# Patient Record
Sex: FEMALE | Race: WHITE | NOT HISPANIC OR LATINO | Employment: STUDENT | RURAL
[De-identification: names, ages, dates, MRNs, and addresses within clinical notes are randomized per-mention and may not be internally consistent; named-entity substitution may affect disease eponyms.]

---
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
## 2022-09-02 ENCOUNTER — PATIENT ROUNDING (BHMG ONLY) (OUTPATIENT)
Dept: FAMILY MEDICINE CLINIC | Facility: CLINIC | Age: 22
End: 2022-09-02

## 2022-09-02 ENCOUNTER — OFFICE VISIT (OUTPATIENT)
Dept: FAMILY MEDICINE CLINIC | Facility: CLINIC | Age: 22
End: 2022-09-02

## 2022-09-02 VITALS
DIASTOLIC BLOOD PRESSURE: 78 MMHG | OXYGEN SATURATION: 99 % | BODY MASS INDEX: 19.4 KG/M2 | WEIGHT: 128 LBS | TEMPERATURE: 98 F | HEART RATE: 82 BPM | HEIGHT: 68 IN | SYSTOLIC BLOOD PRESSURE: 115 MMHG

## 2022-09-02 DIAGNOSIS — J02.9 SORE THROAT: Primary | ICD-10-CM

## 2022-09-02 DIAGNOSIS — R06.7 SNEEZING: ICD-10-CM

## 2022-09-02 DIAGNOSIS — J06.9 ACUTE URI: ICD-10-CM

## 2022-09-02 DIAGNOSIS — R05.9 COUGH: ICD-10-CM

## 2022-09-02 LAB
EXPIRATION DATE: NORMAL
EXPIRATION DATE: NORMAL
FLUAV AG UPPER RESP QL IA.RAPID: NOT DETECTED
FLUBV AG UPPER RESP QL IA.RAPID: NOT DETECTED
INTERNAL CONTROL: NORMAL
INTERNAL CONTROL: NORMAL
Lab: NORMAL
Lab: NORMAL
S PYO AG THROAT QL: NEGATIVE
SARS-COV-2 AG UPPER RESP QL IA.RAPID: NOT DETECTED

## 2022-09-02 PROCEDURE — 87880 STREP A ASSAY W/OPTIC: CPT | Performed by: NURSE PRACTITIONER

## 2022-09-02 PROCEDURE — 87428 SARSCOV & INF VIR A&B AG IA: CPT | Performed by: NURSE PRACTITIONER

## 2022-09-02 PROCEDURE — 99203 OFFICE O/P NEW LOW 30 MIN: CPT | Performed by: NURSE PRACTITIONER

## 2022-09-02 RX ORDER — LEVOCETIRIZINE DIHYDROCHLORIDE 5 MG/1
5 TABLET, FILM COATED ORAL EVERY EVENING
Qty: 30 TABLET | Refills: 3 | Status: SHIPPED | OUTPATIENT
Start: 2022-09-02 | End: 2022-10-02

## 2022-09-02 RX ORDER — METHYLPREDNISOLONE 4 MG/1
TABLET ORAL
Qty: 21 TABLET | Refills: 0 | Status: SHIPPED | OUTPATIENT
Start: 2022-09-02

## 2022-09-02 RX ORDER — FLUTICASONE PROPIONATE 50 MCG
2 SPRAY, SUSPENSION (ML) NASAL DAILY
Qty: 11.1 ML | Refills: 3 | Status: SHIPPED | OUTPATIENT
Start: 2022-09-02

## 2022-09-02 NOTE — PROGRESS NOTES
September 2, 2022    Hello, may I speak with Parisa Aguila?    My name is Ria      I am  with [facility]  [address] [address] [phone].    Before we get started may I verify your date of birth? 2000    I am calling to officially welcome you to our practice and ask about your recent visit. Is this a good time to talk? Left message to return call if willing to participate in brief survey.     Tell me about your visit with us. What things went well?      We're always looking for ways to make our patients' experiences even better. Do you have recommendations on ways we may improve?     Overall were you satisfied with your first visit to our practice?        I appreciate you taking the time to speak with me today. Is there anything else I can do for you?       Thank you, and have a great day.

## 2022-09-02 NOTE — PROGRESS NOTES
Subjective     Chief Complaint  Sore Throat, Earache, and Establish Care    Subjective          Parisa Aguila is a 21 y.o. female who presents today to [facility] for initial evaluation .    HPI:   URI   This is a new problem. The current episode started in the past 7 days. The problem has been gradually improving. There has been no fever. Associated symptoms include congestion, coughing, a plugged ear sensation, sneezing, a sore throat and swollen glands. The treatment provided mild relief.       The following portions of the patient's history were reviewed and updated as appropriate: allergies, current medications, past family history, past medical history, past social history, past surgical history and problem list.    Objective     Objective     Allergy:   No Known Allergies     Current Medications:   Current Outpatient Medications   Medication Sig Dispense Refill   • fluticasone (Flonase) 50 MCG/ACT nasal spray 2 sprays into the nostril(s) as directed by provider Daily. 11.1 mL 3   • levocetirizine (XYZAL) 5 MG tablet Take 1 tablet by mouth Every Evening for 30 days. 30 tablet 3   • methylPREDNISolone (MEDROL) 4 MG dose pack Take as directed on package instructions. 21 tablet 0     No current facility-administered medications for this visit.       Past Medical History:  Past Medical History:   Diagnosis Date   • Acid reflux    • Migraines        Past Surgical History:  History reviewed. No pertinent surgical history.    Social History:  Social History     Socioeconomic History   • Marital status: Single   Tobacco Use   • Smoking status: Never Smoker   • Smokeless tobacco: Never Used   Vaping Use   • Vaping Use: Never used   Substance and Sexual Activity   • Alcohol use: No   • Drug use: No   • Sexual activity: Never     Comment: Student       Family History:  Family History   Problem Relation Age of Onset   • Migraine headaches Mother    • Hyperlipidemia Father    •  "Hypertension Father        Review of Systems:  Review of Systems   Constitutional: Negative.    HENT: Positive for congestion, sneezing and sore throat.    Eyes: Negative.    Respiratory: Positive for cough.    Endocrine: Negative.    Genitourinary: Negative.    Musculoskeletal: Negative.    Skin: Negative.    Allergic/Immunologic: Negative.    Neurological: Negative.    Hematological: Negative.    Psychiatric/Behavioral: Negative.        Vital Signs:   /78 (BP Location: Left arm, Patient Position: Sitting, Cuff Size: Adult)   Pulse 82   Temp 98 °F (36.7 °C) (Temporal)   Ht 172.7 cm (68\")   Wt 58.1 kg (128 lb)   SpO2 99%   BMI 19.46 kg/m²      Physical Exam:  Physical Exam  HENT:      Right Ear: Hearing and tympanic membrane normal.      Left Ear: Hearing and tympanic membrane normal.      Nose: Congestion and rhinorrhea present.               PHQ-9 Score  PHQ-9 Total Score: 0     Lab Review  Office Visit on 09/02/2022   Component Date Value Ref Range Status   • Rapid Strep A Screen 09/02/2022 Negative  Negative, VALID, INVALID, Not Performed Final   • Internal Control 09/02/2022 Passed  Passed Final   • Lot Number 09/02/2022 [id number]   Final   • Expiration Date 09/02/2022 09-30-23   Final   • SARS Antigen 09/02/2022 Not Detected  Not Detected, Presumptive Negative Final   • Influenza A Antigen VLAD 09/02/2022 Not Detected  Not Detected Final   • Influenza B Antigen VLAD 09/02/2022 Not Detected  Not Detected Final   • Internal Control 09/02/2022 Passed  Passed Final   • Lot Number 09/02/2022 [id number]   Final   • Expiration Date 09/02/2022 02-20-23   Final        Radiology Results        Assessment / Plan         Assessment and Plan     -- She presents today with a primary complaint of sore throat.  She also complains of a fullness feeling in her ears, cough, sneezing, as well as some congestion.  She states she recently moved back into her dorm at college and thinks that the dampness in her dorm room " may be contributing to the symptoms.  We tested her for strep, COVID, flu A, and flu B.  These test all came back negative.  For her upper respiratory symptoms I have instructed her to use as I will once daily, Flonase, and a Medrol Dosepak to help her with her symptoms.      Diagnoses and all orders for this visit:    1. Sore throat (Primary)  -     POCT rapid strep A  -     POCT SARS-CoV-2 Antigen VLAD + Flu    2. Acute URI  -     levocetirizine (XYZAL) 5 MG tablet; Take 1 tablet by mouth Every Evening for 30 days.  Dispense: 30 tablet; Refill: 3  -     fluticasone (Flonase) 50 MCG/ACT nasal spray; 2 sprays into the nostril(s) as directed by provider Daily.  Dispense: 11.1 mL; Refill: 3  -     methylPREDNISolone (MEDROL) 4 MG dose pack; Take as directed on package instructions.  Dispense: 21 tablet; Refill: 0    3. Cough    4. Sneezing        Discussed possible differential diagnoses, testing, treatment, recommended non-pharmacological interventions, risks, warning signs to monitor for that would indicate need for follow-up in clinic or ER. If no improvement with these regimens or you have new or worsening symptoms follow-up. Patient verbalizes understanding and agreement with plan of care. Denies further needs or concerns.     Patient was given instructions and counseling regarding her condition and for health maintenance advised.    BMI is within normal parameters. No other follow-up for BMI required.           Health Maintenance  Health Maintenance:   Health Maintenance Due   Topic Date Due   • ANNUAL PHYSICAL  Never done   • HPV VACCINES (1 - 2-dose series) Never done   • HEPATITIS C SCREENING  Never done   • TDAP/TD VACCINES (1 - Tdap) Never done   • COVID-19 Vaccine (3 - Booster for Pfizer series) 02/16/2022        Meds ordered during this visit  New Medications Ordered This Visit   Medications   • levocetirizine (XYZAL) 5 MG tablet     Sig: Take 1 tablet by mouth Every Evening for 30 days.     Dispense:  30  tablet     Refill:  3   • fluticasone (Flonase) 50 MCG/ACT nasal spray     Si sprays into the nostril(s) as directed by provider Daily.     Dispense:  11.1 mL     Refill:  3   • methylPREDNISolone (MEDROL) 4 MG dose pack     Sig: Take as directed on package instructions.     Dispense:  21 tablet     Refill:  0       Visit Diagnoses    ICD-10-CM ICD-9-CM   1. Sore throat  J02.9 462   2. Acute URI  J06.9 465.9   3. Cough  R05.9 786.2   4. Sneezing  R06.7 784.99       Patient was given instructions and counseling regarding her condition or for health maintenance advice. Please see specific information pulled into the AVS if appropriate.     Follow Up   Return if symptoms worsen or fail to improve.          This document has been electronically signed by JIA Buck   2022 15:09 EDT    Dictated Utilizing Dragon Dictation: Part of this note may be an electronic transcription/translation of spoken language to printed text using the Dragon Dictation System.

## 2022-10-05 ENCOUNTER — TELEPHONE (OUTPATIENT)
Dept: FAMILY MEDICINE CLINIC | Facility: CLINIC | Age: 22
End: 2022-10-05

## 2022-10-05 DIAGNOSIS — B07.0 PLANTAR WART: Primary | ICD-10-CM

## 2022-10-05 NOTE — TELEPHONE ENCOUNTER
Returned call to pt  mother. Per report, pt has had a plantars wart for about 6-8 months and no improvemetn with home management. It continues to be uncomfortable for the patient. They are asking for a podiatrist referral of Dr. Makayla sebastian.     The patient is currently away at college and will only be home for fall break from 10/12-10/14.

## 2022-10-05 NOTE — TELEPHONE ENCOUNTER
Caller: Kristina Aguila    Relationship: Mother    Best call back number: [phone]    What is the best time to reach you: ANYTIME    Who are you requesting to speak with (clinical staff, provider,  specific staff member): PROVIDER     Do you know the name of the person who called: MOTHER    What was the call regarding: PATIENTS MOTHER STATES THAT SHE WOULD LIKE A CALL ABOUT A PODIATRIST REFERRAL.    Do you require a callback: YES

## 2022-10-05 NOTE — TELEPHONE ENCOUNTER
I entered podiatry referral as requested. Patient's mother messaged me and she plans to make that appt for patient, given that she knows patient's schedule better than we do.

## 2023-08-08 ENCOUNTER — OFFICE VISIT (OUTPATIENT)
Dept: FAMILY MEDICINE CLINIC | Facility: CLINIC | Age: 23
End: 2023-08-08
Payer: COMMERCIAL

## 2023-08-08 VITALS
BODY MASS INDEX: 20.76 KG/M2 | OXYGEN SATURATION: 98 % | TEMPERATURE: 97.5 F | RESPIRATION RATE: 16 BRPM | SYSTOLIC BLOOD PRESSURE: 110 MMHG | HEART RATE: 83 BPM | HEIGHT: 68 IN | DIASTOLIC BLOOD PRESSURE: 80 MMHG | WEIGHT: 137 LBS

## 2023-08-08 DIAGNOSIS — Z11.1 SCREENING-PULMONARY TB: Primary | ICD-10-CM

## 2023-08-08 NOTE — PROGRESS NOTES
"Subjective   Parisa Aguila is a 22 y.o. female.     Chief Complaint   Patient presents with    TB Test       History of Present Illness  She presents needing a tb skin test for school. She is going to college and starts in the clinical setting soon. She states she has had negative tb skin tests in the past. She denies shortness of breath, wheezing, cough.      The following portions of the patient's history were reviewed and updated as appropriate: allergies, current medications, past family history, past medical history, past social history, past surgical history and problem list.    Review of Systems   Constitutional:  Negative for fatigue and fever.   HENT:  Negative for ear pain, rhinorrhea and sore throat.    Respiratory:  Negative for cough, chest tightness and shortness of breath.    Cardiovascular:  Negative for chest pain and palpitations.   Gastrointestinal:  Negative for abdominal pain, blood in stool, constipation, diarrhea, nausea and vomiting.   Genitourinary:  Negative for dysuria and hematuria.   Allergic/Immunologic: Positive for environmental allergies.   Psychiatric/Behavioral:  Negative for suicidal ideas.      Objective     /80 (BP Location: Right arm, Patient Position: Sitting, Cuff Size: Adult)   Pulse 83   Temp 97.5 øF (36.4 øC) (Temporal)   Resp 16   Ht 172.7 cm (67.99\")   Wt 62.1 kg (137 lb)   SpO2 98%   BMI 20.84 kg/mý     Physical Exam  Vitals reviewed.   Constitutional:       General: She is not in acute distress.     Appearance: Normal appearance. She is well-developed. She is not diaphoretic.   HENT:      Head: Normocephalic and atraumatic.   Cardiovascular:      Rate and Rhythm: Normal rate and regular rhythm.      Heart sounds: Normal heart sounds, S1 normal and S2 normal. No murmur heard.    No friction rub. No gallop.   Pulmonary:      Effort: Pulmonary effort is normal. No respiratory distress.      Breath sounds: Normal breath sounds.   Abdominal:      General: Bowel " sounds are normal. There is no distension.      Palpations: Abdomen is soft.      Tenderness: There is no abdominal tenderness.   Skin:     General: Skin is warm and dry.   Neurological:      Mental Status: She is alert and oriented to person, place, and time.   Psychiatric:         Behavior: Behavior normal.         Thought Content: Thought content normal.         Judgment: Judgment normal.       Current Outpatient Medications   Medication Sig Dispense Refill    fluticasone (Flonase) 50 MCG/ACT nasal spray 2 sprays into the nostril(s) as directed by provider Daily. 11.1 mL 3    levocetirizine (XYZAL) 5 MG tablet Take 1 tablet by mouth Every Evening for 30 days. 30 tablet 3     No current facility-administered medications for this visit.            Assessment & Plan     Problem List Items Addressed This Visit    None  Visit Diagnoses       Screening-pulmonary TB    -  Primary              ICD-10-CM ICD-9-CM   1. Screening-pulmonary TB  Z11.1 V74.1       Plan: TB skin test ordered. Follow up with [facility] as needed.    @Body mass index is 20.84 kg/mý.                Understands disease processes and need for medications.  Understands reasons for urgent and emergent care.  Patient (& family) verbalized agreement for treatment plan.   Emotional support and active listening provided.  Patient provided time to verbalize feelings.           BMI is within normal parameters. No other follow-up for BMI required.      This document has been electronically signed by JIA Burrell   August 8, 2023 10:58 EDT

## 2024-09-09 ENCOUNTER — OFFICE VISIT (OUTPATIENT)
Dept: FAMILY MEDICINE CLINIC | Facility: CLINIC | Age: 24
End: 2024-09-09
Payer: COMMERCIAL

## 2024-09-09 VITALS
DIASTOLIC BLOOD PRESSURE: 60 MMHG | BODY MASS INDEX: 22.07 KG/M2 | TEMPERATURE: 98.3 F | HEIGHT: 68 IN | OXYGEN SATURATION: 99 % | HEART RATE: 77 BPM | SYSTOLIC BLOOD PRESSURE: 110 MMHG | WEIGHT: 145.6 LBS

## 2024-09-09 DIAGNOSIS — Z00.00 ENCOUNTER FOR WELLNESS EXAMINATION: Primary | ICD-10-CM

## 2024-09-09 DIAGNOSIS — L98.9 SKIN LESION OF BACK: ICD-10-CM

## 2024-09-09 DIAGNOSIS — R14.0 ABDOMINAL BLOATING: ICD-10-CM

## 2024-09-09 DIAGNOSIS — Z00.00 HEALTH CARE MAINTENANCE: ICD-10-CM

## 2024-09-09 PROCEDURE — 86803 HEPATITIS C AB TEST: CPT | Performed by: INTERNAL MEDICINE

## 2024-09-09 PROCEDURE — 86258 DGP ANTIBODY EACH IG CLASS: CPT | Performed by: INTERNAL MEDICINE

## 2024-09-09 PROCEDURE — 86364 TISS TRNSGLTMNASE EA IG CLAS: CPT | Performed by: INTERNAL MEDICINE

## 2024-09-09 PROCEDURE — 80050 GENERAL HEALTH PANEL: CPT | Performed by: INTERNAL MEDICINE

## 2024-09-09 PROCEDURE — 36415 COLL VENOUS BLD VENIPUNCTURE: CPT | Performed by: INTERNAL MEDICINE

## 2024-09-09 PROCEDURE — 99395 PREV VISIT EST AGE 18-39: CPT | Performed by: INTERNAL MEDICINE

## 2024-09-09 PROCEDURE — 82784 ASSAY IGA/IGD/IGG/IGM EACH: CPT | Performed by: INTERNAL MEDICINE

## 2024-09-09 PROCEDURE — 80061 LIPID PANEL: CPT | Performed by: INTERNAL MEDICINE

## 2024-09-09 PROCEDURE — 86231 EMA EACH IG CLASS: CPT | Performed by: INTERNAL MEDICINE

## 2024-09-09 NOTE — PROGRESS NOTES
Venipuncture Blood Specimen Collection  Venipuncture performed in left arm by Heather Symes, MA with good hemostasis. Patient tolerated the procedure well without complications.   09/09/24   Heather Symes, MA

## 2024-09-09 NOTE — PROGRESS NOTES
Patient Name: Parisa Aguila Today's Date: 2024   Patient MRN / CSN: [id number] / [id number] Date of Encounter: 2024   Patient Age / : 23 y.o. / 2000 Encounter Provider: Feli Benavides DO   Referring Physician: No ref. provider found          Parisa is a 23 y.o. female who is being seen today for Establish Care (Feeling good, no issues at this time. She has been Gluten free for 2 years. She would like to have a gluten allergy test done. ) and Annual Exam      History of Present Illness    Pt presents for  exam today. Pt reports doing well.      Healthy Diet: trying to, gluten free X 2 yrs and feeling better   Exercise: trying to  Regular Eye Exam: utd  Regular Dental Exam: utd  Hearing Loss: no   Immunizations: update Gardisil at pharmacy  Mammogram: start at 42  Pap: never, LMP 26  Colonoscopy: start at 47    Allergies include:Patient has no known allergies.  No current outpatient medications on file.     No current facility-administered medications for this visit.     Past Medical History:   Diagnosis Date    Acid reflux     Migraines      Family History   Problem Relation Age of Onset    Migraine headaches Mother     Hyperlipidemia Father     Hypertension Father      History reviewed. No pertinent surgical history.  Social History     Substance and Sexual Activity   Alcohol Use No     Social History     Tobacco Use   Smoking Status Never   Smokeless Tobacco Never     Social History     Substance and Sexual Activity   Drug Use No     Review of Systems   Respiratory:  Negative for shortness of breath.    Gastrointestinal:         Infrequent blood in stool, especially after eating a high gluten meal. Ab pain and bloating with gluten meals.  Feeling better with gluten-free meals.   Genitourinary:         LMP 26        Depression Assessment Review:  PHQ-9 Total Score: 0  Vital Signs & Measurements Taken This Encounter  /60 (BP Location: Left arm, Patient Position: Sitting, Cuff Size:  "Adult)   Pulse 77   Temp 98.3 °F (36.8 °C) (Oral)   Ht 171.5 cm (67.5\")   Wt 66 kg (145 lb 9.6 oz)   SpO2 99%   BMI 22.47 kg/m²    SpO2 Percentage    09/09/24 1018   SpO2: 99%        BMI is within normal parameters. No other follow-up for BMI required.      Physical Exam  Vitals reviewed.   Constitutional:       General: She is not in acute distress.  HENT:      Head: Normocephalic and atraumatic.   Eyes:      General: No scleral icterus.     Extraocular Movements: Extraocular movements intact.      Conjunctiva/sclera: Conjunctivae normal.      Pupils: Pupils are equal, round, and reactive to light.   Cardiovascular:      Rate and Rhythm: Normal rate and regular rhythm.   Pulmonary:      Effort: Pulmonary effort is normal. No respiratory distress.      Breath sounds: Normal breath sounds.   Abdominal:      Palpations: Abdomen is soft.      Tenderness: There is no abdominal tenderness.   Musculoskeletal:         General: No swelling.      Cervical back: Neck supple. No tenderness.   Lymphadenopathy:      Cervical: No cervical adenopathy.   Skin:     General: Skin is warm and dry.      Coloration: Skin is not jaundiced.      Comments: Elevated, annular nevus on left thoracic back with differing colors   Neurological:      Mental Status: She is alert.   Psychiatric:         Mood and Affect: Mood normal.         Behavior: Behavior normal.         Thought Content: Thought content normal.         Judgment: Judgment normal.              Assessment & Plan  There is no problem list on file for this patient.      ICD-10-CM ICD-9-CM   1. Encounter for wellness examination  Z00.00 V70.0   2. Health care maintenance  Z00.00 V70.0   3. Abdominal bloating  R14.0 787.3   4. Skin lesion of back  L98.9 709.9     Orders Placed This Encounter   Procedures    Celiac Comprehensive Panel     Order Specific Question:   Release to patient     Answer:   Routine Release [[id number]]    CBC (No Diff)     Order Specific Question:   " Release to patient     Answer:   Routine Release [[id number]]    Comprehensive Metabolic Panel     Order Specific Question:   Release to patient     Answer:   Routine Release [[id number]]    Lipid Panel     Order Specific Question:   Release to patient     Answer:   Routine Release [[id number]]    TSH     Order Specific Question:   Release to patient     Answer:   Routine Release [[id number]]    Hepatitis C Antibody     Order Specific Question:   Release to patient     Answer:   Routine Release [[id number]]       Meds Ordered During Visit:  No orders of the defined types were placed in this encounter.    Immunizations were discussed with patient today.  I encouraged her to update Gardasil at her pharmacy.    Age-appropriate screenings were discussed with patient today.  I encouraged her to update her Pap smear soon.  We will update metabolic screenings today as above.    I encourage patient to continue healthy eating.  Also encouraged her to consider mole removal of the irregular nevus on her thoracic back.  She agrees to consider this.  She has seen dermatology for this in the past.      Return in about 1 year (around 9/9/2025), or if symptoms worsen or fail to improve, for Recheck.          Referring Provider (if known): No ref. provider found      This document has been electronically signed by Feli Benavides DO  September 9, 2024 19:23 EDT    Feli Benavides DO, FACOI  [address] [place] [address]  [phone] (office)    Part of this note may be an electronic transcription/translation of spoken language to printed text using the Dragon Dictation System.

## 2024-09-10 LAB
ALBUMIN SERPL-MCNC: 4.6 G/DL (ref 3.5–5.2)
ALBUMIN/GLOB SERPL: 1.6 G/DL
ALP SERPL-CCNC: 76 U/L (ref 39–117)
ALT SERPL W P-5'-P-CCNC: 10 U/L (ref 1–33)
ANION GAP SERPL CALCULATED.3IONS-SCNC: 11.7 MMOL/L (ref 5–15)
AST SERPL-CCNC: 18 U/L (ref 1–32)
BILIRUB SERPL-MCNC: 0.2 MG/DL (ref 0–1.2)
BUN SERPL-MCNC: 7 MG/DL (ref 6–20)
BUN/CREAT SERPL: 9.3 (ref 7–25)
CALCIUM SPEC-SCNC: 9.4 MG/DL (ref 8.6–10.5)
CHLORIDE SERPL-SCNC: 102 MMOL/L (ref 98–107)
CHOLEST SERPL-MCNC: 182 MG/DL (ref 0–200)
CO2 SERPL-SCNC: 24.3 MMOL/L (ref 22–29)
CREAT SERPL-MCNC: 0.75 MG/DL (ref 0.57–1)
DEPRECATED RDW RBC AUTO: 42.9 FL (ref 37–54)
EGFRCR SERPLBLD CKD-EPI 2021: 116.3 ML/MIN/1.73
ENDOMYSIUM IGA SER QL: NEGATIVE
ERYTHROCYTE [DISTWIDTH] IN BLOOD BY AUTOMATED COUNT: 13.1 % (ref 12.3–15.4)
GLIADIN PEPTIDE IGA SER-ACNC: 4 UNITS (ref 0–19)
GLIADIN PEPTIDE IGG SER-ACNC: 2 UNITS (ref 0–19)
GLOBULIN UR ELPH-MCNC: 2.8 GM/DL
GLUCOSE SERPL-MCNC: 83 MG/DL (ref 65–99)
HCT VFR BLD AUTO: 40.2 % (ref 34–46.6)
HCV AB SER QL: NORMAL
HDLC SERPL-MCNC: 72 MG/DL (ref 40–60)
HGB BLD-MCNC: 13.4 G/DL (ref 12–15.9)
IGA SERPL-MCNC: 199 MG/DL (ref 87–352)
LDLC SERPL CALC-MCNC: 101 MG/DL (ref 0–100)
LDLC/HDLC SERPL: 1.41 {RATIO}
MCH RBC QN AUTO: 29.9 PG (ref 26.6–33)
MCHC RBC AUTO-ENTMCNC: 33.3 G/DL (ref 31.5–35.7)
MCV RBC AUTO: 89.7 FL (ref 79–97)
PLATELET # BLD AUTO: 241 10*3/MM3 (ref 140–450)
PMV BLD AUTO: 11.1 FL (ref 6–12)
POTASSIUM SERPL-SCNC: 4 MMOL/L (ref 3.5–5.2)
PROT SERPL-MCNC: 7.4 G/DL (ref 6–8.5)
RBC # BLD AUTO: 4.48 10*6/MM3 (ref 3.77–5.28)
SODIUM SERPL-SCNC: 138 MMOL/L (ref 136–145)
TRIGL SERPL-MCNC: 42 MG/DL (ref 0–150)
TSH SERPL DL<=0.05 MIU/L-ACNC: 1.43 UIU/ML (ref 0.27–4.2)
TTG IGA SER-ACNC: <2 U/ML (ref 0–3)
TTG IGG SER-ACNC: <2 U/ML (ref 0–5)
VLDLC SERPL-MCNC: 9 MG/DL (ref 5–40)
WBC NRBC COR # BLD AUTO: 3.69 10*3/MM3 (ref 3.4–10.8)

## 2025-01-07 ENCOUNTER — OFFICE VISIT (OUTPATIENT)
Dept: FAMILY MEDICINE CLINIC | Facility: CLINIC | Age: 25
End: 2025-01-07
Payer: COMMERCIAL

## 2025-01-07 VITALS
HEIGHT: 68 IN | DIASTOLIC BLOOD PRESSURE: 64 MMHG | TEMPERATURE: 98.1 F | WEIGHT: 138.6 LBS | BODY MASS INDEX: 21.01 KG/M2 | OXYGEN SATURATION: 100 % | HEART RATE: 77 BPM | SYSTOLIC BLOOD PRESSURE: 110 MMHG

## 2025-01-07 DIAGNOSIS — H65.193 ACUTE MUCOID OTITIS MEDIA OF BOTH EARS: Primary | ICD-10-CM

## 2025-01-07 DIAGNOSIS — U07.1 COVID-19 VIRUS INFECTION: ICD-10-CM

## 2025-01-07 LAB
EXPIRATION DATE: ABNORMAL
FLUAV AG UPPER RESP QL IA.RAPID: NOT DETECTED
FLUBV AG UPPER RESP QL IA.RAPID: NOT DETECTED
INTERNAL CONTROL: ABNORMAL
Lab: ABNORMAL
SARS-COV-2 AG UPPER RESP QL IA.RAPID: DETECTED

## 2025-01-07 PROCEDURE — 99213 OFFICE O/P EST LOW 20 MIN: CPT | Performed by: INTERNAL MEDICINE

## 2025-01-07 PROCEDURE — 87428 SARSCOV & INF VIR A&B AG IA: CPT | Performed by: INTERNAL MEDICINE

## 2025-01-07 RX ORDER — AZITHROMYCIN 250 MG/1
TABLET, FILM COATED ORAL
Qty: 6 TABLET | Refills: 0 | Status: SHIPPED | OUTPATIENT
Start: 2025-01-07

## 2025-01-07 NOTE — PROGRESS NOTES
Patient Name: Parisa Aguila Today's Date: 2025   Patient MRN / CSN: [id number] / [id number] Date of Encounter: 2025   Patient Age / : 24 y.o. / 2000 Encounter Provider: Feli Benavides DO   Referring Physician: No ref. provider found          Parisa is a 24 y.o. female who is being seen today for URI (Has been having a headache, cough, left ear pain, sore throat and congestion. Did have chills and body aches for a short period. )      URI   This is a new problem. Episode onset: 4-5 days ago. Maximum temperature: unmeasured. Associated symptoms include congestion, coughing, ear pain and headaches. Pertinent negatives include no wheezing. She has tried decongestant for the symptoms. The treatment provided mild relief.       Allergies include:Patient has no known allergies.  Current Outpatient Medications   Medication Sig Dispense Refill    azithromycin (Zithromax Z-Gavino) 250 MG tablet Take 2 tablets by mouth on day 1, then 1 tablet daily on days 2-5 6 tablet 0     No current facility-administered medications for this visit.     Past Medical History:   Diagnosis Date    Acid reflux     Migraines      Family History   Problem Relation Age of Onset    Migraine headaches Mother     Hyperlipidemia Father     Hypertension Father      History reviewed. No pertinent surgical history.  Social History     Substance and Sexual Activity   Alcohol Use No     Social History     Tobacco Use   Smoking Status Never   Smokeless Tobacco Never     Social History     Substance and Sexual Activity   Drug Use No     Review of Systems   HENT:  Positive for congestion and ear pain.    Respiratory:  Positive for cough. Negative for wheezing.    Neurological:  Positive for headaches.        Depression Assessment Review:  PHQ-9 Total Score:    Vital Signs & Measurements Taken This Encounter  /64 (BP Location: Left arm, Patient Position: Sitting, Cuff Size: Adult)   Pulse 77   Temp 98.1 °F (36.7 °C) (Oral)   Ht 171.5 cm  "(67.5\")   Wt 62.9 kg (138 lb 9.6 oz)   SpO2 100%   BMI 21.39 kg/m²    SpO2 Percentage    01/07/25 1145   SpO2: 100%        BMI is within normal parameters. No other follow-up for BMI required.      Physical Exam  Vitals reviewed.   Constitutional:       General: She is not in acute distress.  HENT:      Head: Normocephalic and atraumatic.      Ears:      Comments: TMs are erythematous, with mucoid effusions and bulging bilaterally  Eyes:      General: No scleral icterus.     Extraocular Movements: Extraocular movements intact.      Conjunctiva/sclera: Conjunctivae normal.      Pupils: Pupils are equal, round, and reactive to light.   Cardiovascular:      Rate and Rhythm: Normal rate and regular rhythm.   Pulmonary:      Effort: Pulmonary effort is normal. No respiratory distress.      Breath sounds: Normal breath sounds. No wheezing or rhonchi.   Musculoskeletal:         General: No swelling.      Cervical back: Neck supple. Tenderness present.   Lymphadenopathy:      Cervical: Cervical adenopathy present.   Skin:     General: Skin is warm and dry.      Coloration: Skin is not jaundiced.   Neurological:      Mental Status: She is alert.   Psychiatric:         Mood and Affect: Mood normal.         Behavior: Behavior normal.         Thought Content: Thought content normal.         Judgment: Judgment normal.              Assessment & Plan  There is no problem list on file for this patient.      ICD-10-CM ICD-9-CM   1. Acute mucoid otitis media of both ears  H65.193 381.02   2. COVID-19 virus infection  U07.1 079.89     Diagnoses and all orders for this visit:    1. Acute mucoid otitis media of both ears (Primary)  -     POCT SARS-CoV-2 Antigen VLAD + Flu  -     azithromycin (Zithromax Z-Gavino) 250 MG tablet; Take 2 tablets by mouth on day 1, then 1 tablet daily on days 2-5  Dispense: 6 tablet; Refill: 0    2. COVID-19 virus infection  -     azithromycin (Zithromax Z-Gavino) 250 MG tablet; Take 2 tablets by mouth on day 1, " then 1 tablet daily on days 2-5  Dispense: 6 tablet; Refill: 0         Meds Ordered During Visit:  New Medications Ordered This Visit   Medications    azithromycin (Zithromax Z-Gavino) 250 MG tablet     Sig: Take 2 tablets by mouth on day 1, then 1 tablet daily on days 2-5     Dispense:  6 tablet     Refill:  0     Parisa was positive for COVID and negative for flu on today's evaluation.  I discussed these findings with her.  I discussed current CDC quarantine recommendations.  Also, I encouraged her to hydrate well, practice deep breathing exercises, and ambulate every hour while awake.  I recommended Z-Gavino given her bilateral ear infections and sent this in for her today.  I encouraged her to reach out if symptoms were to be persistent or worsening.    Return if symptoms worsen or fail to improve, for Annual, Next scheduled follow up.          Referring Provider (if known): No ref. provider found      This document has been electronically signed by Feli Benavides DO  January 7, 2025 14:00 EST    Feli Benavides DO, FACOI  [address] [place], [place] [id number] [phone] (office)    Part of this note may be an electronic transcription/translation of spoken language to printed text using the Dragon Dictation System.

## 2025-05-21 ENCOUNTER — CLINICAL SUPPORT (OUTPATIENT)
Dept: FAMILY MEDICINE CLINIC | Facility: CLINIC | Age: 25
End: 2025-05-21
Payer: COMMERCIAL

## 2025-05-21 DIAGNOSIS — Z00.00 HEALTH CARE MAINTENANCE: Primary | ICD-10-CM

## 2025-05-21 DIAGNOSIS — Z23 NEED FOR TDAP VACCINATION: ICD-10-CM

## 2025-05-21 DIAGNOSIS — Z11.1 VISIT FOR TB SKIN TEST: ICD-10-CM

## 2025-05-21 NOTE — PROGRESS NOTES
Capillary Blood Specimen Collection  Capillary blood collection performed in LEFT ARM by Liliana Cannon MA. Patient tolerated the procedure well without complications.   05/23/25   Hedy Curiel test was administered on 5/21/25 @ 3:30 pm pt was instructed to come back in 48 to 72 hours to get the tb skin test read.   Liliana Cannon CMA

## 2025-05-21 NOTE — PROGRESS NOTES
Immunization  Immunization performed in LEFT DELTOID by Liliana Cannon MA. Patient tolerated the procedure well without complications.  05/21/25   Liliana Cannon MA       Injection  Injection performed in LEFT ARM by Liliana Cannon MA. Patient tolerated the procedure well without complications.  05/21/25   Liliana Cannon MA

## 2025-05-23 LAB
INDURATION: 0 MM (ref 0–10)
Lab: NORMAL
Lab: NORMAL
TB SKIN TEST: NEGATIVE

## 2025-05-23 NOTE — PROGRESS NOTES
Tb test was administered on 5/21/25 @ 3:30 pm pt was instructed to come back in 48 to 72 hours to get the tb skin test read.   Liliana Cannon CMA

## 2025-05-28 ENCOUNTER — CLINICAL SUPPORT (OUTPATIENT)
Dept: FAMILY MEDICINE CLINIC | Facility: CLINIC | Age: 25
End: 2025-05-28
Payer: COMMERCIAL

## 2025-05-28 DIAGNOSIS — Z11.1 SCREENING-PULMONARY TB: Primary | ICD-10-CM

## 2025-06-02 LAB
INDURATION: 0 MM (ref 0–10)
Lab: NORMAL
Lab: NORMAL
TB SKIN TEST: NEGATIVE

## 2025-06-02 PROCEDURE — 86580 TB INTRADERMAL TEST: CPT | Performed by: INTERNAL MEDICINE
